# Patient Record
Sex: FEMALE | Race: BLACK OR AFRICAN AMERICAN | NOT HISPANIC OR LATINO | Employment: STUDENT | URBAN - METROPOLITAN AREA
[De-identification: names, ages, dates, MRNs, and addresses within clinical notes are randomized per-mention and may not be internally consistent; named-entity substitution may affect disease eponyms.]

---

## 2023-07-25 LAB
CALCIDIOL (25 OH VITAMIN D3) (NG/ML) IN SER/PLAS: 24 NG/ML
THYROTROPIN (MIU/L) IN SER/PLAS BY DETECTION LIMIT <= 0.05 MIU/L: 26.93 MIU/L (ref 0.44–3.98)
THYROXINE (T4) FREE (NG/DL) IN SER/PLAS: 0.72 NG/DL (ref 0.61–1.12)

## 2023-11-02 PROBLEM — Z90.89 STATUS POST THYROIDECTOMY: Status: ACTIVE | Noted: 2023-11-02

## 2023-11-02 PROBLEM — J45.909 ASTHMA (HHS-HCC): Status: ACTIVE | Noted: 2023-11-02

## 2023-11-02 PROBLEM — R79.89 LOW VITAMIN D LEVEL: Status: ACTIVE | Noted: 2023-11-02

## 2023-11-02 PROBLEM — E89.0 POST-SURGICAL HYPOTHYROIDISM: Status: ACTIVE | Noted: 2023-11-02

## 2023-11-02 PROBLEM — H02.409 PTOSIS: Status: ACTIVE | Noted: 2023-11-02

## 2023-11-02 PROBLEM — H50.21 HYPOTROPIA OF RIGHT EYE: Status: ACTIVE | Noted: 2023-11-02

## 2023-11-02 PROBLEM — H53.2 DIPLOPIA: Status: ACTIVE | Noted: 2023-11-02

## 2023-11-02 PROBLEM — J30.9 ALLERGIC RHINITIS: Status: ACTIVE | Noted: 2023-11-02

## 2023-11-02 PROBLEM — L74.510 HYPERHIDROSIS OF AXILLA: Status: ACTIVE | Noted: 2023-11-02

## 2023-11-02 PROBLEM — H50.22 VERTICAL STRABISMUS OF LEFT EYE: Status: ACTIVE | Noted: 2023-11-02

## 2023-11-02 PROBLEM — Z98.890 STATUS POST THYROIDECTOMY: Status: ACTIVE | Noted: 2023-11-02

## 2023-11-02 PROBLEM — E89.0 STATUS POST THYROIDECTOMY: Status: ACTIVE | Noted: 2023-11-02

## 2023-11-02 PROBLEM — E55.9 VITAMIN D DEFICIENCY: Status: ACTIVE | Noted: 2023-11-02

## 2023-11-02 PROBLEM — G70.00 OCULAR MYASTHENIA (MULTI): Status: ACTIVE | Noted: 2023-11-02

## 2023-11-02 PROBLEM — L74.519 EXCESSIVE SWEATING, LOCAL: Status: ACTIVE | Noted: 2023-11-02

## 2023-11-02 PROBLEM — E05.00 GRAVES DISEASE: Status: ACTIVE | Noted: 2023-11-02

## 2023-11-02 PROBLEM — E05.90 HYPERTHYROIDISM: Status: ACTIVE | Noted: 2023-11-02

## 2023-11-02 RX ORDER — ATENOLOL 25 MG/1
1 TABLET ORAL DAILY
COMMUNITY
End: 2023-11-03 | Stop reason: ALTCHOICE

## 2023-11-02 RX ORDER — LEVOTHYROXINE SODIUM 75 UG/1
1 TABLET ORAL DAILY
COMMUNITY
Start: 2022-06-15 | End: 2023-11-03

## 2023-11-02 RX ORDER — PYRIDOSTIGMINE BROMIDE 60 MG/1
1.5 TABLET ORAL 3 TIMES DAILY
COMMUNITY

## 2023-11-02 RX ORDER — PREDNISOLONE 5 MG/1
5 TABLET ORAL
COMMUNITY

## 2023-11-03 ENCOUNTER — LAB (OUTPATIENT)
Dept: LAB | Facility: LAB | Age: 18
End: 2023-11-03
Payer: COMMERCIAL

## 2023-11-03 ENCOUNTER — OFFICE VISIT (OUTPATIENT)
Dept: PEDIATRIC ENDOCRINOLOGY | Facility: CLINIC | Age: 18
End: 2023-11-03
Payer: COMMERCIAL

## 2023-11-03 VITALS
DIASTOLIC BLOOD PRESSURE: 71 MMHG | HEIGHT: 65 IN | HEART RATE: 64 BPM | TEMPERATURE: 95 F | WEIGHT: 145.4 LBS | SYSTOLIC BLOOD PRESSURE: 110 MMHG | BODY MASS INDEX: 24.22 KG/M2

## 2023-11-03 DIAGNOSIS — E89.0 POST-SURGICAL HYPOTHYROIDISM: Primary | ICD-10-CM

## 2023-11-03 DIAGNOSIS — E89.0 POST-SURGICAL HYPOTHYROIDISM: ICD-10-CM

## 2023-11-03 DIAGNOSIS — E55.9 VITAMIN D DEFICIENCY: ICD-10-CM

## 2023-11-03 LAB
25(OH)D3 SERPL-MCNC: 21 NG/ML (ref 30–100)
T4 FREE SERPL-MCNC: 1.11 NG/DL (ref 0.78–1.48)
TSH SERPL-ACNC: 7.91 MIU/L (ref 0.44–3.98)

## 2023-11-03 PROCEDURE — 36415 COLL VENOUS BLD VENIPUNCTURE: CPT

## 2023-11-03 PROCEDURE — 84439 ASSAY OF FREE THYROXINE: CPT

## 2023-11-03 PROCEDURE — 99417 PROLNG OP E/M EACH 15 MIN: CPT | Performed by: PEDIATRICS

## 2023-11-03 PROCEDURE — 1036F TOBACCO NON-USER: CPT | Performed by: PEDIATRICS

## 2023-11-03 PROCEDURE — 99215 OFFICE O/P EST HI 40 MIN: CPT | Performed by: PEDIATRICS

## 2023-11-03 PROCEDURE — 82306 VITAMIN D 25 HYDROXY: CPT

## 2023-11-03 PROCEDURE — 84443 ASSAY THYROID STIM HORMONE: CPT

## 2023-11-03 RX ORDER — ERGOCALCIFEROL 1.25 MG/1
50000 CAPSULE ORAL
Qty: 6 CAPSULE | Refills: 0 | Status: SHIPPED | OUTPATIENT
Start: 2023-11-03 | End: 2023-12-09

## 2023-11-03 RX ORDER — LEVOTHYROXINE SODIUM 88 UG/1
88 TABLET ORAL
Qty: 30 TABLET | Refills: 11 | Status: SHIPPED | OUTPATIENT
Start: 2023-11-03 | End: 2024-11-02

## 2023-11-03 NOTE — PATIENT INSTRUCTIONS
Recommend scheduling followup visits with Dr Delcid and Dr Queen     After the visit we discussed the results of today's labs and the need to increase your levothyroxine up to equivalent of 88 microgms daily AND another blood test in 4-6 wks to verify that is sufficient to maintain normal thyroid hormone levels and minimize affect on your eyes    I will call you with the results of thyroid hormone level that you obtain in December 2023    Suggest you schedule for followup with me in  6 months so that you can fit into your schedule

## 2023-11-03 NOTE — PROGRESS NOTES
Subjective   Vlad Recinos is a 18-4/9 yo female who presents for followup of postthyroidectomy hypothyroidism, last seen 7/21/22 about 4 wks after thyroidectomy for Graves Disease (originally dxed 4/2017 and managed with methimazole)    In addition she has ocular Myasthenia Gravis managed with pyridostigmine. Last visit with Dr Delcid, ophthalmology 9/2022    Several TFTs since thyroidectomy - initially on LT4 75 microgm daily then 11/15/22 TSH 0.07 (outside lab) - thus reduced LT4  to 50 microgm daily     1/23/23 TSH 3.9 on 50 microgm daily  7/25/23 TSH 26.9, 25OHD 24 though may have missed some doses prior to labs   - resumed LT4 75 microgm daily according to prescription and results note    Hx of vitamin D deficiency (25OHD 18) noted prior to thyroidectomy (5/18/22)    HPI    Communication in summer - 8/9/23, confirming that she had gotten message to increase dose AND should have ergocalciferol once monthly to maintain vitamin D levels    LEVOTHYROXINE - Only missing 1-2 doses per month of 75 microgm - takes at 6-7AM, doesn't eat until 1 PM    - denies heat or (possibly mild cold intol with weather change) intolerance or diaphoresis   - regular menses     No headaches nor tremors    - Stools - daily     In past 2 wks having trouble falling asleep .  Also having eye drooping, dry and may be bulging. No spontaneous tearing nor conjunctival injection.    Followed by Dr Delcid for Graves ophthalmopathy and ocular aspect of myasthenia gravis (though last visit 9/29/22 with instructions to return in 2-3 motnhs)    - on Prednisone 4 tabs of 5 mg each daily - increased 2 wks ago based on how she feels and observation of visiotn according to his past instructions    For past 2-3 yrs has taken prednisone in same dosing as above for 4 wk duration then waits at least 4 wks before would have another course.  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    Physical Exam    Asymmetry with right eye proptosis relative to left. With right lateral downward gaze has diplopia and subtle malalignment.  No conjunctival injection     - Neck scar, no thyroid tissue appreciated    Abd soft without hepatomegaly or striae    No tremor though slight palpmar diaphoresis  Normal DTR and relaxation phase    9/5/23 [facility] (scanned in [facility]) TSH 3.33 (0.4-4.5)    Assessment/Plan   Post surgical hypothyroidism   - clinically euthyroid with TSH in upper normal range      May benefit from LT4 88 microgm daily    2. Hx of graves ophthalmopathy and ocular myasthenia  May have had eye changes but could be result of High TSH over summer (2023) which is now resolved though 9/5 TSH in upper normal range.  Will check TSH today And aim for Tsh < 2 I.e. lower half of normal TSH    Discussed relationship between eye and thyroid as well as Prednisone side effects - with emphasis on potential negative impact on bone if used chronically and adds to the importance of maintaining normal vitamin D levels (adult 25OHD > 30) and calcium intake    Needs f/up with DR Delcid and Bret    Reaching age for transition to adult [facility] care - to be discussed at next visit    ADDENDUM 7PM -- TSH 7.91 - phone call to Vlad - improved TSH but needs further increase in dose to88 microgm daily. Submitting new Rx while in meantime before picks up new dose, take 1 and 1/2 tabs of 75 microgm tomorrow morning (and can take 1.5 " "tabs two days per week) while on the other 5 days of week take ONE tab (2nd 1.5 tab dose on Tuesday) THEN when get 88 mciriomg wuill be once daily    - emphasized to her that it is possible that eye droop was consequence of hypothyroidism with low free T4 in summer    - answered her questions about why \"low thyroid hormone level\" while elevated TSH    Next TFTs in 4-6 wks to verify adequacy of dose vs further titration, will aim for lower half of normal for TSH to minimize impact on eyes    25 OH Vitamin D 21 ng/ml - in Fall thus likely to fall further -- begin once weekly ergocalciferol 50,000 international units weekly x 6 wks -- reassess with future lab set after completion of this course  "

## 2023-11-20 PROBLEM — E05.90 HYPERTHYROIDISM: Status: RESOLVED | Noted: 2023-11-02 | Resolved: 2023-11-20

## 2023-12-12 ENCOUNTER — LAB (OUTPATIENT)
Dept: LAB | Facility: LAB | Age: 18
End: 2023-12-12
Payer: COMMERCIAL

## 2023-12-12 DIAGNOSIS — E89.0 POST-SURGICAL HYPOTHYROIDISM: ICD-10-CM

## 2023-12-12 LAB
T4 FREE SERPL-MCNC: 0.99 NG/DL (ref 0.61–1.12)
TSH SERPL-ACNC: 0.16 MIU/L (ref 0.44–3.98)

## 2023-12-12 PROCEDURE — 84443 ASSAY THYROID STIM HORMONE: CPT

## 2023-12-12 PROCEDURE — 84439 ASSAY OF FREE THYROXINE: CPT

## 2023-12-14 ENCOUNTER — TELEPHONE (OUTPATIENT)
Dept: PEDIATRIC ENDOCRINOLOGY | Facility: CLINIC | Age: 18
End: 2023-12-14
Payer: COMMERCIAL

## 2023-12-14 DIAGNOSIS — E89.0 POST-SURGICAL HYPOTHYROIDISM: Primary | ICD-10-CM

## 2023-12-14 NOTE — TELEPHONE ENCOUNTER
No answer when called -- message recorded on answering machine which:  indicated that will be sending message through chart and based on thyroid test results reduce dose of Levothyroxine by 1/2 tablet for one day of each week (88 microgm tablet)    - repeat thyroid tests in 4-6 wks.      - followup visit with me in May 2024 OR schedule with adult endo, one option is [facility] OR any  adult endocrinologist that is closer to your work or home

## 2024-01-06 DIAGNOSIS — G70.00 MYASTHENIA GRAVIS WITHOUT (ACUTE) EXACERBATION (MULTI): ICD-10-CM

## 2024-01-08 RX ORDER — PREDNISONE 5 MG/1
TABLET ORAL
Qty: 180 TABLET | Refills: 4 | Status: SHIPPED | OUTPATIENT
Start: 2024-01-08

## 2024-01-19 ENCOUNTER — APPOINTMENT (OUTPATIENT)
Dept: ENDOCRINOLOGY | Facility: HOSPITAL | Age: 19
End: 2024-01-19
Payer: COMMERCIAL

## 2024-01-23 ENCOUNTER — APPOINTMENT (OUTPATIENT)
Dept: ENDOCRINOLOGY | Facility: HOSPITAL | Age: 19
End: 2024-01-23
Payer: COMMERCIAL

## 2024-01-26 ENCOUNTER — DOCUMENTATION (OUTPATIENT)
Dept: PEDIATRIC ENDOCRINOLOGY | Facility: HOSPITAL | Age: 19
End: 2024-01-26
Payer: COMMERCIAL

## 2024-01-26 ENCOUNTER — TELEPHONE (OUTPATIENT)
Dept: PEDIATRIC ENDOCRINOLOGY | Facility: CLINIC | Age: 19
End: 2024-01-26

## 2024-01-26 NOTE — PROGRESS NOTES
Lab results from 1/25/24: TSH 0.03 and FreeT4 1.7 despite reduction in dose of LT4 to 88 microgm daily for 6 day/wk and 44 microgm once weekly    Will reduce dose to 75 microgms daily --- once patient returns call and confirms the current dose that she is taking

## 2024-01-28 ENCOUNTER — PATIENT MESSAGE (OUTPATIENT)
Dept: PEDIATRIC ENDOCRINOLOGY | Facility: CLINIC | Age: 19
End: 2024-01-28
Payer: COMMERCIAL

## 2024-01-28 DIAGNOSIS — E05.90 HYPERTHYROIDISM: Primary | ICD-10-CM

## 2024-01-29 ENCOUNTER — DOCUMENTATION (OUTPATIENT)
Dept: PEDIATRIC ENDOCRINOLOGY | Facility: HOSPITAL | Age: 19
End: 2024-01-29
Payer: COMMERCIAL

## 2024-01-29 DIAGNOSIS — E89.0 POST-SURGICAL HYPOTHYROIDISM: ICD-10-CM

## 2024-01-29 RX ORDER — LEVOTHYROXINE SODIUM 125 UG/1
62.5 TABLET ORAL DAILY
Qty: 15 TABLET | Refills: 11 | Status: SHIPPED | OUTPATIENT
Start: 2024-01-29 | End: 2025-01-28

## 2024-01-30 NOTE — PROGRESS NOTES
Via patient message, Vlad reports that she has been taking 88 microgm daily for 6 days per week and no levothyroxine once weekly (rather than the 1/14/23 recommendation of 1/2 of 88 microgm once weekly and whole tablet the other 6 days per week)     As previously noted 1/25/24: TFT TSH 0.03 (0.4-4.5), Free T4 1.7 (0.8-1.4)     Based on reported dose actually taking which would equal average of 75 microgm, must reduce dose to successfully reduce TFT adequately --- prescribing 62. 5 microgm daily (1/2 of 125 microgm).  Followup labs in 4 wks as previously noted

## 2024-08-03 ENCOUNTER — LAB (OUTPATIENT)
Dept: LAB | Facility: LAB | Age: 19
End: 2024-08-03
Payer: COMMERCIAL

## 2024-08-03 DIAGNOSIS — E05.90 HYPERTHYROIDISM: ICD-10-CM

## 2024-08-03 LAB
T4 FREE SERPL-MCNC: 0.62 NG/DL (ref 0.61–1.12)
TSH SERPL-ACNC: 19.19 MIU/L (ref 0.44–3.98)

## 2024-08-03 PROCEDURE — 84443 ASSAY THYROID STIM HORMONE: CPT

## 2024-08-03 PROCEDURE — 36415 COLL VENOUS BLD VENIPUNCTURE: CPT

## 2024-08-03 PROCEDURE — 84439 ASSAY OF FREE THYROXINE: CPT

## 2024-08-05 ENCOUNTER — TELEPHONE (OUTPATIENT)
Dept: PEDIATRIC ENDOCRINOLOGY | Facility: HOSPITAL | Age: 19
End: 2024-08-05
Payer: COMMERCIAL

## 2024-08-05 DIAGNOSIS — E89.0 POST-SURGICAL HYPOTHYROIDISM: Primary | ICD-10-CM

## 2024-08-05 RX ORDER — LEVOTHYROXINE SODIUM 75 UG/1
75 TABLET ORAL DAILY
Qty: 30 TABLET | Refills: 11 | Status: SHIPPED | OUTPATIENT
Start: 2024-08-05 | End: 2025-08-05

## 2024-08-05 NOTE — TELEPHONE ENCOUNTER
Phone call - Vlad denies any symptoms   - taking 1/2 of 125 microgm tablet daily however missing about 1 dose per week (thus av about 53 microgm/d) - following suppressed TSH and discussion on 1/29/24.  Had been due for f/up TFTs 4 wks later but obtained 8/3/24    Will increase to LT4 75 microgm daily and try to take daily   - needs f/up TFT in 4 wks AND   - to book f/up visit    SOCIAL currently working 7A-4PM Mon-Fri, not attending school at this time

## 2024-08-19 ENCOUNTER — APPOINTMENT (OUTPATIENT)
Dept: ENDOCRINOLOGY | Facility: CLINIC | Age: 19
End: 2024-08-19
Payer: COMMERCIAL

## 2024-09-20 ENCOUNTER — LAB (OUTPATIENT)
Dept: LAB | Facility: LAB | Age: 19
End: 2024-09-20
Payer: COMMERCIAL

## 2024-09-20 DIAGNOSIS — E89.0 POST-SURGICAL HYPOTHYROIDISM: ICD-10-CM

## 2024-09-20 LAB
T4 FREE SERPL-MCNC: 0.86 NG/DL (ref 0.61–1.12)
TSH SERPL-ACNC: 17.56 MIU/L (ref 0.44–3.98)

## 2024-09-20 PROCEDURE — 84443 ASSAY THYROID STIM HORMONE: CPT

## 2024-09-20 PROCEDURE — 36415 COLL VENOUS BLD VENIPUNCTURE: CPT

## 2024-09-20 PROCEDURE — 84439 ASSAY OF FREE THYROXINE: CPT

## 2024-09-26 ENCOUNTER — APPOINTMENT (OUTPATIENT)
Dept: PEDIATRIC ENDOCRINOLOGY | Facility: CLINIC | Age: 19
End: 2024-09-26
Payer: COMMERCIAL

## 2024-09-26 VITALS
BODY MASS INDEX: 26.46 KG/M2 | HEIGHT: 64 IN | DIASTOLIC BLOOD PRESSURE: 71 MMHG | WEIGHT: 154.98 LBS | HEART RATE: 71 BPM | SYSTOLIC BLOOD PRESSURE: 110 MMHG | RESPIRATION RATE: 16 BRPM

## 2024-09-26 DIAGNOSIS — E89.0 POST-SURGICAL HYPOTHYROIDISM: Primary | ICD-10-CM

## 2024-09-26 DIAGNOSIS — E05.00 GRAVES' OPHTHALMOPATHY: ICD-10-CM

## 2024-09-26 PROCEDURE — 3008F BODY MASS INDEX DOCD: CPT | Performed by: PEDIATRICS

## 2024-09-26 PROCEDURE — 99214 OFFICE O/P EST MOD 30 MIN: CPT | Performed by: PEDIATRICS

## 2024-09-26 RX ORDER — LEVOTHYROXINE SODIUM 88 UG/1
88 TABLET ORAL DAILY
Qty: 30 TABLET | Refills: 11 | Status: SHIPPED | OUTPATIENT
Start: 2024-09-26 | End: 2025-09-26

## 2024-09-26 NOTE — PATIENT INSTRUCTIONS
We will increase your levothyroxine to 88 microgms daily   - if you forget on one day you make 2 tablets the next day   - check blood tests again in 4-6 wks and we will determine if further increase in dose is needed    Change timing of sea phelan so that at least 4 hrs separation from taking levothyroxine    Take daily womens multivitamin with dinner especially between Oct and April of each year    See Dr Delcid for followup of Graves eye disease and past history of ocular myasthenia    Next followup in 6 months

## 2024-09-26 NOTE — PROGRESS NOTES
"Subjective   Vlad Recinos is a 19 y.o. female presenting for Follow-up.  Last seen 11/3/23 at which time, recommended increasing LT4 to 88 microgm daily     [facility] 1/25/24: Free T4 1.7 (0.8-1.4), TSH 0.03 while taking 88 microgm 6 days/wk and 44 microgms  1/26/23 in phone discussion based on results, reduced LT4 to 62.5 microgms daily, however        Vlad is being seen today for post-surgical hypothyroidism, following medication therapy for Graves Disease which was diagnosed 4/2017 and managed with methimazole.     Thyroidectomy 6/2022    Additionally has had Graves ophthalmopathy with dry eyes and hx of ocular myasthenia, followed by Dr Delcid    Hx of Present Illness:     Medications : Levothyroxine 75 microgm daily - morning; with water then eating about 60 minutes later    Adherence : misses >2 doses per week - estimate 3 days per month missed dose - no pattern to when forgets.     Hypothyroid Symptoms : fatigue - a little tired but functions with 8.5 hrs sleep daily. Menses 6 days duration,  monthly  Slightly cold intolerance but no hair shedding nor dry skin  Stools - currently daily    Vlad Suspects lactose intolerant so taking Lactaid   Denies dyspnea nor dysphagia    Eating sea phelan (jello) -blue spirulina (made by her grandmother - taking before breakfast. Taking since June 2024 (based on Grandmother's recommendation)    Review of Systems  OPHTHALMOLOGY: No longer takes prednisone and stopped pyridostigmine (care with Dr Delcid)   - 9/23/22 last visit with recs to have f/up in 2-3 mos due to an exacerbation of ocular myasthenia    Objective   /71 (BP Location: Right arm, Patient Position: Sitting)   Pulse 71   Resp 16   Ht 1.636 m (5' 4.41\")   Wt 70.3 kg (154 lb 15.7 oz)   BMI 26.27 kg/m²      Physical Exam   Alert, well- hydrated  PERRL   Thyroid of normal size and consistency  Integument warm and supple  DTR 2+/4+ in all 4 extremities with normal relaxation phase  No tremor with arm " extension and finger abduction        Thyroid Stimulating Hormone   Date Value Ref Range Status   09/20/2024 17.56 (H) 0.44 - 3.98 mIU/L Final   08/03/2024 19.19 (H) 0.44 - 3.98 mIU/L Final   12/12/2023 0.16 (L) 0.44 - 3.98 mIU/L Final     Thyroxine, Free   Date Value Ref Range Status   09/20/2024 0.86 0.61 - 1.12 ng/dL Final   08/03/2024 0.62 0.61 - 1.12 ng/dL Final   12/12/2023 0.99 0.61 - 1.12 ng/dL Final         Assessment/Plan    Post surgical hypothyroidism with recent increase in TSH which in retrospect coincides with addition of Sea phelan jelly added to daily intake, often consumed at same time as levothyroxine (or at least less than 60 minutes after LT4 taken)          - by hx having dose omission almost once weekly as well          - some increase in BMI and weight since 11/2023 visit          - discussed issues of sea phelan (and potentially increased Iodine load as well as fiber) as well as safe means by which to ensure maintaining adequate LT4 levels if misses dose on any given day.       Needs ongoing followup until has adequate understanding of hypothyroidism and LT4 dosing/management of hypothyroidism. Next visit therefore in 6 months - at any time she can transition to adult endo, however I will continue to follow labs and have followup visits until she transitions and visits are still available with me    2.  Hx of ocular myasthenia and risk of Graves ophthalmopathy - due for followup assessment with Dr Delcid in ophthalmology

## 2024-10-21 ENCOUNTER — APPOINTMENT (OUTPATIENT)
Dept: ENDOCRINOLOGY | Facility: CLINIC | Age: 19
End: 2024-10-21
Payer: COMMERCIAL

## 2024-11-13 ENCOUNTER — LAB (OUTPATIENT)
Dept: LAB | Facility: LAB | Age: 19
End: 2024-11-13
Payer: COMMERCIAL

## 2024-11-13 DIAGNOSIS — E89.0 POST-SURGICAL HYPOTHYROIDISM: ICD-10-CM

## 2024-11-13 LAB — TSH SERPL-ACNC: 0.58 MIU/L (ref 0.44–3.98)

## 2024-11-13 PROCEDURE — 84443 ASSAY THYROID STIM HORMONE: CPT

## 2024-11-13 PROCEDURE — 36415 COLL VENOUS BLD VENIPUNCTURE: CPT

## 2024-11-14 DIAGNOSIS — E89.0 POST-SURGICAL HYPOTHYROIDISM: Primary | ICD-10-CM

## 2024-12-15 ENCOUNTER — HOSPITAL ENCOUNTER (EMERGENCY)
Facility: HOSPITAL | Age: 19
Discharge: HOME | End: 2024-12-15
Payer: COMMERCIAL

## 2024-12-15 VITALS
DIASTOLIC BLOOD PRESSURE: 73 MMHG | TEMPERATURE: 98.1 F | SYSTOLIC BLOOD PRESSURE: 113 MMHG | WEIGHT: 150 LBS | OXYGEN SATURATION: 100 % | RESPIRATION RATE: 18 BRPM | BODY MASS INDEX: 25.61 KG/M2 | HEART RATE: 90 BPM | HEIGHT: 64 IN

## 2024-12-15 DIAGNOSIS — B96.89 BACTERIAL VAGINOSIS: Primary | ICD-10-CM

## 2024-12-15 DIAGNOSIS — N76.0 BACTERIAL VAGINOSIS: Primary | ICD-10-CM

## 2024-12-15 LAB
APPEARANCE UR: CLEAR
BACTERIA #/AREA URNS AUTO: ABNORMAL /HPF
BILIRUB UR STRIP.AUTO-MCNC: NEGATIVE MG/DL
CLUE CELLS SPEC QL WET PREP: PRESENT
COLOR UR: ABNORMAL
GLUCOSE UR STRIP.AUTO-MCNC: NORMAL MG/DL
HCG UR QL IA.RAPID: NEGATIVE
HOLD SPECIMEN: NORMAL
KETONES UR STRIP.AUTO-MCNC: NEGATIVE MG/DL
LEUKOCYTE ESTERASE UR QL STRIP.AUTO: NEGATIVE
MUCOUS THREADS #/AREA URNS AUTO: ABNORMAL /LPF
NITRITE UR QL STRIP.AUTO: NEGATIVE
PH UR STRIP.AUTO: 6 [PH]
PROT UR STRIP.AUTO-MCNC: NEGATIVE MG/DL
RBC # UR STRIP.AUTO: ABNORMAL /UL
RBC #/AREA URNS AUTO: ABNORMAL /HPF
SP GR UR STRIP.AUTO: 1.03
SQUAMOUS #/AREA URNS AUTO: ABNORMAL /HPF
T VAGINALIS SPEC QL WET PREP: ABNORMAL
UROBILINOGEN UR STRIP.AUTO-MCNC: NORMAL MG/DL
WBC #/AREA URNS AUTO: ABNORMAL /HPF
WBC VAG QL WET PREP: ABNORMAL
YEAST VAG QL WET PREP: ABNORMAL

## 2024-12-15 PROCEDURE — 99284 EMERGENCY DEPT VISIT MOD MDM: CPT

## 2024-12-15 PROCEDURE — 87210 SMEAR WET MOUNT SALINE/INK: CPT | Performed by: NURSE PRACTITIONER

## 2024-12-15 PROCEDURE — 87591 N.GONORRHOEAE DNA AMP PROB: CPT | Mod: AHULAB | Performed by: NURSE PRACTITIONER

## 2024-12-15 PROCEDURE — 81025 URINE PREGNANCY TEST: CPT | Performed by: NURSE PRACTITIONER

## 2024-12-15 PROCEDURE — 2500000004 HC RX 250 GENERAL PHARMACY W/ HCPCS (ALT 636 FOR OP/ED): Performed by: NURSE PRACTITIONER

## 2024-12-15 PROCEDURE — 96372 THER/PROPH/DIAG INJ SC/IM: CPT | Performed by: NURSE PRACTITIONER

## 2024-12-15 PROCEDURE — 81001 URINALYSIS AUTO W/SCOPE: CPT | Performed by: NURSE PRACTITIONER

## 2024-12-15 PROCEDURE — 2500000001 HC RX 250 WO HCPCS SELF ADMINISTERED DRUGS (ALT 637 FOR MEDICARE OP): Performed by: NURSE PRACTITIONER

## 2024-12-15 RX ORDER — KETOROLAC TROMETHAMINE 30 MG/ML
30 INJECTION, SOLUTION INTRAMUSCULAR; INTRAVENOUS ONCE
Status: COMPLETED | OUTPATIENT
Start: 2024-12-15 | End: 2024-12-15

## 2024-12-15 RX ORDER — METRONIDAZOLE 500 MG/1
500 TABLET ORAL 2 TIMES DAILY
Qty: 14 TABLET | Refills: 0 | Status: SHIPPED | OUTPATIENT
Start: 2024-12-15 | End: 2024-12-22

## 2024-12-15 RX ORDER — NAPROXEN 500 MG/1
500 TABLET ORAL
Qty: 20 TABLET | Refills: 0 | Status: SHIPPED | OUTPATIENT
Start: 2024-12-15 | End: 2024-12-25

## 2024-12-15 RX ORDER — METRONIDAZOLE 500 MG/1
500 TABLET ORAL ONCE
Status: COMPLETED | OUTPATIENT
Start: 2024-12-15 | End: 2024-12-15

## 2024-12-15 ASSESSMENT — PAIN DESCRIPTION - PAIN TYPE: TYPE: ACUTE PAIN

## 2024-12-15 ASSESSMENT — COLUMBIA-SUICIDE SEVERITY RATING SCALE - C-SSRS
2. HAVE YOU ACTUALLY HAD ANY THOUGHTS OF KILLING YOURSELF?: NO
1. IN THE PAST MONTH, HAVE YOU WISHED YOU WERE DEAD OR WISHED YOU COULD GO TO SLEEP AND NOT WAKE UP?: NO
6. HAVE YOU EVER DONE ANYTHING, STARTED TO DO ANYTHING, OR PREPARED TO DO ANYTHING TO END YOUR LIFE?: NO

## 2024-12-15 ASSESSMENT — PAIN - FUNCTIONAL ASSESSMENT: PAIN_FUNCTIONAL_ASSESSMENT: 0-10

## 2024-12-15 ASSESSMENT — PAIN SCALES - GENERAL: PAINLEVEL_OUTOF10: 10 - WORST POSSIBLE PAIN

## 2024-12-15 ASSESSMENT — PAIN DESCRIPTION - LOCATION: LOCATION: PELVIS

## 2024-12-15 NOTE — Clinical Note
Vlad Recinos was seen and treated in our emergency department on 12/15/2024.  She may return to work on 12/17/2024.       If you have any questions or concerns, please don't hesitate to call.      Petros Luque, APRN-CNP

## 2024-12-15 NOTE — ED PROVIDER NOTES
HPI   Chief Complaint   Patient presents with    cramps       19-year-old female presents today with cramps that are pelvic in nature.  She denies prior history of cramps during menstruation she is presently menstruating.  She has a history of ovarian cysts.  She is rating the pain 10 out of 10.  She took Advil 30 minutes before arrival in the ED.  She denies any trauma.  She denies dysuria.  She has had the same sexual partner for 2 months.  She has had no other prior sexual partners in the last 12 months no history of STD.  She denies pregnancy.  She describes the pain as sharp and cramping.  The last time she ate was at 1 AM she had chicken.  She denies nausea or vomiting.  She denies constipation or diarrhea.  She has a history of postsurgical hypothyroidism.  She denies any allergies to medication.      History provided by:  Parent and patient   used: No            Patient History   Past Medical History:   Diagnosis Date    Allergy status to unspecified drugs, medicaments and biological substances 12/14/2015    History of seasonal allergies    Dislocation of unspecified interphalangeal joint of right little finger, initial encounter 05/09/2017    Dislocation of interphalangeal joint of right little finger, initial encounter    Displaced fracture of middle phalanx of right little finger, initial encounter for closed fracture 05/09/2017    Closed displaced fracture of middle phalanx of right little finger, initial encounter    Dry eye syndrome of bilateral lacrimal glands 02/11/2019    Dry eyes, bilateral    Other specified disorders of eye and adnexa 05/09/2017    Thyroid eye disease    Other specified disorders of eye and adnexa 05/09/2017    Thyroid eye disease    Other specified strabismus 02/11/2019    Restrictive strabismus    Unspecified asthma, uncomplicated ([facility]) 06/24/2020    Asthma    Unspecified ptosis of right eyelid 09/29/2022    Acquired ptosis of right eyelid     Past  Surgical History:   Procedure Laterality Date    OTHER SURGICAL HISTORY  06/23/2022    Thyroidectomy     Family History   Problem Relation Name Age of Onset    Hypertension Mother      Diabetes Father      Glaucoma Paternal Great-Grandmother       Social History     Tobacco Use    Smoking status: Never    Smokeless tobacco: Never   Substance Use Topics    Alcohol use: Not on file    Drug use: Not on file       Physical Exam   ED Triage Vitals [12/15/24 0924]   Temperature Heart Rate Respirations BP   36.7 °C (98.1 °F) 90 18 113/73      Pulse Ox Temp Source Heart Rate Source Patient Position   100 % Temporal Monitor Sitting      BP Location FiO2 (%)     Left arm --       Physical Exam  Constitutional:       Appearance: Normal appearance.   HENT:      Head: Normocephalic and atraumatic.      Right Ear: Tympanic membrane normal.      Left Ear: Tympanic membrane normal.      Nose: Nose normal.      Mouth/Throat:      Mouth: Mucous membranes are dry.   Eyes:      Extraocular Movements: Extraocular movements intact.      Pupils: Pupils are equal, round, and reactive to light.   Cardiovascular:      Rate and Rhythm: Normal rate and regular rhythm.      Pulses: Normal pulses.      Heart sounds: Normal heart sounds.   Pulmonary:      Effort: Pulmonary effort is normal.      Breath sounds: Normal breath sounds.   Abdominal:      General: Abdomen is flat.      Palpations: Abdomen is soft.      Tenderness: There is abdominal tenderness.   Genitourinary:     Vagina: Vaginal discharge present.      Comments: Malodorous odor.  Positive right adnexal tenderness.  Patient was menstruating  Musculoskeletal:         General: Normal range of motion.      Cervical back: Normal range of motion and neck supple.   Skin:     General: Skin is warm.      Capillary Refill: Capillary refill takes less than 2 seconds.   Neurological:      General: No focal deficit present.      Mental Status: She is alert and oriented to person, place, and time.    Psychiatric:         Mood and Affect: Mood normal.         Behavior: Behavior normal.           ED Course & MDM   Diagnoses as of 12/15/24 1109   Bacterial vaginosis                 No data recorded                                 Medical Decision Making  Pelvic exam revealed right adnexal tenderness.  This was concerning for STD.  She had a malodorous odor.  Urinalysis was ordered pregnancy was ordered to rule out ectopic.  Patient responded well to Toradol.  Vital signs were stable.  Patient was positive for bacterial vaginosis.  She received her first dose of Flagyl and I sent 7-day prescription to her pharmacy.  She responded well to Toradol and was pain-free.  She was placed on Naprosyn twice daily.  I requested appointment for gynecology and patient understands she should call her central scheduling intake first available appointment.  Safely discharged home with return precautions.    Amount and/or Complexity of Data Reviewed  Labs: ordered.        Procedure  Procedures     Petros Luque, VENKATA-CNP  12/15/24 1102

## 2024-12-16 LAB
C TRACH RRNA SPEC QL NAA+PROBE: NEGATIVE
N GONORRHOEA DNA SPEC QL PROBE+SIG AMP: NEGATIVE

## 2025-01-23 ENCOUNTER — LAB (OUTPATIENT)
Dept: LAB | Facility: LAB | Age: 20
End: 2025-01-23
Payer: COMMERCIAL

## 2025-01-23 DIAGNOSIS — E89.0 POST-SURGICAL HYPOTHYROIDISM: ICD-10-CM

## 2025-01-23 LAB
T4 FREE SERPL-MCNC: 1.5 NG/DL (ref 0.78–1.48)
TSH SERPL-ACNC: 5.61 MIU/L (ref 0.44–3.98)

## 2025-01-23 PROCEDURE — 84439 ASSAY OF FREE THYROXINE: CPT

## 2025-01-23 PROCEDURE — 84443 ASSAY THYROID STIM HORMONE: CPT

## 2025-01-29 NOTE — PROGRESS NOTES
"Subjective   Vlad Recinos is a 19 y.o. female who presents to endocrinology clinic for transition of care (first visit in adult endocrinology)  Last visit: 9/26/24 - Dr. Pimentel    ENDOCRINE Hx:   Graves Disease was diagnosed 4/2017 and managed with methimazole then underwent thyroidectomy 6/2022.  Has been on levothyroxine since.  Additionally has had Graves ophthalmopathy with dry eyes and hx of ocular myasthenia, followed by Dr Delcid.    INTERVAL Hx:   Currently taking 88mcg of levothyroxine every morning, with water.  Waits at least 3 hours before eating typically.  Reports missed a couple doses this month when she was sick with a URI 2 weeks ago.      States missed appt with Dr. Delcid yesterday, says eyes/vision lately have been \"fine\" since last flare was back in Oct (droopy eyelids, sensitive to light, double vision).      Occasional headaches. Reports good energy and appetite.  No heat/cold intolerance, diarrhea, constipation, skin/hair changes, palpitations, tremors, myalgias, excessive weight changes. Menses monthly, LMP 2 weeks ago     ROS: otherwise negative.    Objective   /67 (BP Location: Right arm, Patient Position: Sitting, BP Cuff Size: Adult)   Pulse 73   Temp 35.7 °C (96.3 °F) (Temporal)   Ht 1.626 m (5' 4\")   Wt 70.8 kg (156 lb)   BMI 26.78 kg/m²     Physical Exam  Alert and conversant, in no acute distress  Sclera anicteric, no lid lag, subtle proptosis  mmm  Thyroidectomy scar well healed, no palpable thyroid tissue  normal work of breathing  No resting tremor  Skin warm, normal moisture; no acanthosis, hirsutism, or acne     Lab Results   Component Value Date    TSH 5.61 (H) 01/23/2025    FREET4 1.50 (H) 01/23/2025    ALT 7 03/04/2023    AST 14 03/04/2023         Assessment/Plan   19 y.o. F with Graves disease c/b ophthalmopathy as well as ocular myasthenia s/p TTx in 2022 now with Post-surgical hypothyroidism    Last TSH a bit above normal limits, although she is overall " clinically euthyroid.  Taking it appropriately and overall reports good adherence.  Missed recent ophtho follow-up - is now scheduled to see Dr. Delcid in May    PLAN  -  INCREASE levothyroxine (Synthroid, Levoxyl) to 100 mcg by mouth early in the morning.. Take on an empty stomach at the same time each day, at least 30 minutes prior to eating (advised can also use up her current supply of 88mcg tabs by taking an extra tab once a week).    -     TSH with reflex to Free T4 if abnormal; 6 weeks  -     Follow Up In Endocrinology; annually or sooner PRN

## 2025-01-30 ENCOUNTER — APPOINTMENT (OUTPATIENT)
Dept: OPHTHALMOLOGY | Facility: CLINIC | Age: 20
End: 2025-01-30
Payer: COMMERCIAL

## 2025-01-31 ENCOUNTER — APPOINTMENT (OUTPATIENT)
Dept: ENDOCRINOLOGY | Facility: CLINIC | Age: 20
End: 2025-01-31
Payer: COMMERCIAL

## 2025-01-31 VITALS
TEMPERATURE: 96.3 F | HEART RATE: 73 BPM | HEIGHT: 64 IN | SYSTOLIC BLOOD PRESSURE: 105 MMHG | BODY MASS INDEX: 26.63 KG/M2 | WEIGHT: 156 LBS | DIASTOLIC BLOOD PRESSURE: 67 MMHG

## 2025-01-31 DIAGNOSIS — E05.00 GRAVES DISEASE: ICD-10-CM

## 2025-01-31 DIAGNOSIS — E89.0 POST-SURGICAL HYPOTHYROIDISM: Primary | ICD-10-CM

## 2025-01-31 PROCEDURE — 99214 OFFICE O/P EST MOD 30 MIN: CPT | Performed by: INTERNAL MEDICINE

## 2025-01-31 PROCEDURE — 1036F TOBACCO NON-USER: CPT | Performed by: INTERNAL MEDICINE

## 2025-01-31 PROCEDURE — 3008F BODY MASS INDEX DOCD: CPT | Performed by: INTERNAL MEDICINE

## 2025-01-31 RX ORDER — LEVOTHYROXINE SODIUM 100 UG/1
100 TABLET ORAL DAILY
Qty: 90 TABLET | Refills: 3 | Status: SHIPPED | OUTPATIENT
Start: 2025-01-31 | End: 2026-01-31

## 2025-01-31 ASSESSMENT — PATIENT HEALTH QUESTIONNAIRE - PHQ9
2. FEELING DOWN, DEPRESSED OR HOPELESS: NOT AT ALL
1. LITTLE INTEREST OR PLEASURE IN DOING THINGS: NOT AT ALL
SUM OF ALL RESPONSES TO PHQ9 QUESTIONS 1 AND 2: 0

## 2025-01-31 ASSESSMENT — ENCOUNTER SYMPTOMS
LOSS OF SENSATION IN FEET: 0
DEPRESSION: 0
OCCASIONAL FEELINGS OF UNSTEADINESS: 0

## 2025-02-28 DIAGNOSIS — E89.0 POST-SURGICAL HYPOTHYROIDISM: ICD-10-CM

## 2025-03-13 ENCOUNTER — APPOINTMENT (OUTPATIENT)
Dept: PEDIATRIC ENDOCRINOLOGY | Facility: CLINIC | Age: 20
End: 2025-03-13
Payer: COMMERCIAL

## 2025-04-11 LAB — TSH SERPL-ACNC: 1.51 MIU/L

## 2025-05-28 NOTE — PROGRESS NOTES
"Assessment and Plan    02/18/2021 neck flexion & extension 5/5 strength.      05/29/2025 Sidney OD 24 & OS 24 at base 95.  09/14/2021 Sidney OD 23 & OS 23 at base 95.  04/15/2021 Sidney OD 23 & OS 23.5 at base 95.  06/05/2020 Sidney OD 22 & OS 22 at base 95.  10/21/2019 Sidney OD 23 & OS 22 at base 95.  04/15/2019 Sidney OD 22 & OS 21.5 at base 95.  02/14/2019 Sidney OD 26 & OS 23 at base 105.  08/16/2018 Sidney OD 27 & OS 24 at base 105.     04/18/2019 CT chest, by report, shows \"1. Diffusely enlarged thyroid gland. Otherwise, no acute intrathoracic abnormalities identified. 2. No thymic lesion identified.\"  04/27/2017 CT facial bones, which I personally reviewed previously, shows proptosis.    03/26/2019 EMG “Recruited single fiber jitter study of the right frontalis is abnormal and consistent with a neuromuscular junction disorder as seen with myasthenia gravis.”  03/23/2019 EMG “EMG examination of the right upper and lower extremities, including slow repetitive stimulation of the median, spinal accessory and facial nerves at rest and following exercise, is normal. There is no evidence, by repetitive stimulations,   of a neuromuscular junction defect of the postsynaptic type, as seen with myasthenia gravis. In addition, there is evidence of myopathy seen in limited needle EMG. A single fiber EMG study is recommended if clinically indicated.”    Myasthenia gravis studies  Lab Results   Component Value Date/Time    MUSKAB 0.00 03/18/2019 1640     Thyroid eye disease studies  Lab Results   Component Value Date/Time    RECE 3.08 (H) 07/11/2019 1640    TSI 1.5 (H) 07/11/2019 1640     03/18/2019 LRP4 & MuSK antibodies negative. TFTs wnl.  04/27/2017 TSIg 4.9 & 4.3. TPO 43. Acetylcholine receptor binding, blocking & modulating antibodies negative.  TSH consistently low 4/27/2017 - 10/12/2017.    06/05/2020 OCT RNFL  & . (stable)  10/21/2019 OCT RNFL  & . (stable)  02/14/2019 OCT RNFL  & OS " 116.  08/16/2018 OCT RNFL  & .  01/29/2018 OCT RNFL  & .  10/30/2017 OCT RNFL OD 99 & .    06/05/2020 HVF 24-2 OD fovea 31, wnl MD -0.78 & OS fovea 40, wnl MD -0.85.  10/21/2019 HVF 24-2 OD fovea 40, wnl MD -1.25 & OS fovea 35, mild scatter MD -3.80.  02/14/2019 HVF 24-2 OD wnl MD -1.61 & OS wnl MD -2.01.  08/16/2018 HVF 24-2 OD wnl MD -1.01 & OS wnl MD -1.47.  01/29/2018 HVF 24-2 OD wnl MD -0.96 & OS wnl MD -1.23.  05/31/2017 HVF 24-2 OD wnl MD -2.16 & OS wnl MD -1.79.    This 19 year-old woman with a history of Graves disease, ocular myasthenia gravis presents for follow up evaluation of thyroid eye disease and ocular myasthenia gravis.    She has lid findings consistent with thyroid eye disease that appear overall stable. Her episodes likely were ocular myasthenia exacerbation. The lack of upper lid retraction is influenced by ocular myasthenia, but given good lid position and lack of diplopia, I do not recommend resuming ocular myasthenia treatment. Thyroid function control remains important. We discussed the options of surgical management of thyroid eye disease if she is interested given long term stability although reasonable to delay.    Plan    Continue off pyridostigmine and prednisone.  Continue thyroid function monitoring and treatment.   Artificial tears as needed.    Follow up in 6 months with stereo plates, returning sooner with possible pyridostigmine prescription if lids worsen or diplopia happens. (last dilated 10/21/2019).

## 2025-05-29 ENCOUNTER — APPOINTMENT (OUTPATIENT)
Dept: OPHTHALMOLOGY | Facility: CLINIC | Age: 20
End: 2025-05-29
Payer: COMMERCIAL

## 2025-05-29 DIAGNOSIS — E05.00 GRAVES' OPHTHALMOPATHY: ICD-10-CM

## 2025-05-29 DIAGNOSIS — G70.00 OCULAR MYASTHENIA (MULTI): Primary | ICD-10-CM

## 2025-05-29 PROCEDURE — 99213 OFFICE O/P EST LOW 20 MIN: CPT | Performed by: PSYCHIATRY & NEUROLOGY

## 2025-05-29 ASSESSMENT — CONF VISUAL FIELD
OD_INFERIOR_NASAL_RESTRICTION: 0
OS_SUPERIOR_NASAL_RESTRICTION: 0
OS_INFERIOR_TEMPORAL_RESTRICTION: 0
OS_INFERIOR_NASAL_RESTRICTION: 0
OD_SUPERIOR_NASAL_RESTRICTION: 0
OD_NORMAL: 1
OD_SUPERIOR_TEMPORAL_RESTRICTION: 0
METHOD: COUNTING FINGERS
OS_SUPERIOR_TEMPORAL_RESTRICTION: 0
OD_INFERIOR_TEMPORAL_RESTRICTION: 0
OS_NORMAL: 1

## 2025-05-29 ASSESSMENT — ENCOUNTER SYMPTOMS
CARDIOVASCULAR NEGATIVE: 0
GASTROINTESTINAL NEGATIVE: 0
CONSTITUTIONAL NEGATIVE: 0
MUSCULOSKELETAL NEGATIVE: 0
HEMATOLOGIC/LYMPHATIC NEGATIVE: 0
RESPIRATORY NEGATIVE: 0
NEUROLOGICAL NEGATIVE: 0
ALLERGIC/IMMUNOLOGIC NEGATIVE: 0
EYES NEGATIVE: 1
ENDOCRINE NEGATIVE: 0
PSYCHIATRIC NEGATIVE: 0

## 2025-05-29 ASSESSMENT — CUP TO DISC RATIO
OS_RATIO: .3
OD_RATIO: .3

## 2025-05-29 ASSESSMENT — EXTERNAL EXAM - RIGHT EYE: OD_EXAM: NORMAL

## 2025-05-29 ASSESSMENT — VISUAL ACUITY
METHOD: SNELLEN - LINEAR
OD_CC: 20/20-2
CORRECTION_TYPE: GLASSES
OS_CC: 20/20-1

## 2025-05-29 ASSESSMENT — MARGIN REFLEX DISTANCE
OD_MRD1: 4
OS_MRD1: 3

## 2025-05-29 ASSESSMENT — SLIT LAMP EXAM - LIDS
COMMENTS: LID RETRACTION: LOWER LID
COMMENTS: LID RETRACTION: LOWER LID

## 2025-05-29 ASSESSMENT — TONOMETRY
OS_IOP_MMHG: 16
OD_IOP_MMHG: 14
IOP_METHOD: GOLDMANN APPLANATION

## 2025-05-29 ASSESSMENT — LEVATOR FUNCTION
OD_LEVATOR: 18
OS_LEVATOR: 18

## 2025-05-29 ASSESSMENT — EXTERNAL EXAM - LEFT EYE: OS_EXAM: NORMAL

## 2025-06-16 ENCOUNTER — APPOINTMENT (OUTPATIENT)
Dept: OBSTETRICS AND GYNECOLOGY | Facility: CLINIC | Age: 20
End: 2025-06-16
Payer: COMMERCIAL

## 2025-06-16 VITALS
SYSTOLIC BLOOD PRESSURE: 114 MMHG | BODY MASS INDEX: 25.51 KG/M2 | DIASTOLIC BLOOD PRESSURE: 62 MMHG | HEIGHT: 64 IN | WEIGHT: 149.4 LBS

## 2025-06-16 DIAGNOSIS — N85.2 ENLARGED UTERUS: ICD-10-CM

## 2025-06-16 DIAGNOSIS — Z01.419 WELL WOMAN EXAM WITH ROUTINE GYNECOLOGICAL EXAM: Primary | ICD-10-CM

## 2025-06-16 DIAGNOSIS — Z12.4 CERVICAL CANCER SCREENING: ICD-10-CM

## 2025-06-16 DIAGNOSIS — N84.1 CERVICAL POLYP: ICD-10-CM

## 2025-06-16 DIAGNOSIS — R10.2 PELVIC PAIN: ICD-10-CM

## 2025-06-16 PROCEDURE — 3008F BODY MASS INDEX DOCD: CPT

## 2025-06-16 PROCEDURE — 99385 PREV VISIT NEW AGE 18-39: CPT

## 2025-06-16 ASSESSMENT — ENCOUNTER SYMPTOMS
RESPIRATORY NEGATIVE: 0
CARDIOVASCULAR NEGATIVE: 0
EYES NEGATIVE: 0
NEUROLOGICAL NEGATIVE: 0
GASTROINTESTINAL NEGATIVE: 0
CONSTITUTIONAL NEGATIVE: 0
ALLERGIC/IMMUNOLOGIC NEGATIVE: 0
ENDOCRINE NEGATIVE: 0
HEMATOLOGIC/LYMPHATIC NEGATIVE: 0
PSYCHIATRIC NEGATIVE: 0
MUSCULOSKELETAL NEGATIVE: 0

## 2025-06-16 ASSESSMENT — PAIN SCALES - GENERAL: PAINLEVEL_OUTOF10: 0-NO PAIN

## 2025-06-16 NOTE — PROGRESS NOTES
"Rosemary Recinos is a 19 y.o. female who is here for New Patient Visit (Reports abdominal pain periodically for 2 yrs,/No vaginal discharge , odor or discomfort).     Concerns today:  Patient presents reporting 2 years of abdominal pain. Patient reports that the pain is sometimes random but when she is on her cycle it is much worse. Reports that the pain is like a severe cramping sensation.  Patient is not currently having pain. Patient reports that the last time she had the pain was last month. Reports that it is generally on her right side.     Patient had imaging done in  and was + for 4.7 cm probable hemorrhagic cyst at the right ovary.   Periods are regular every 28-30 days, lasting 6 days.   Dysmenorrhea:none.   Cyclic symptoms include none.      PMH: Graves Disease- posy thyroidectomy in     Sexual Activity: sexually active, male partners; Patient reports 1 partners in the last 12 months.    History of prior STI: none  Desires STI screening? No    Current contraception: none    Last pap: NA  Last mammogram: NA    Family history of uterine or ovarian cancer: no  Family history of breast cancer: yes - Great grandmother       OB History    Para Term  AB Living   0 0 0 0 0 0   SAB IAB Ectopic Multiple Live Births   0 0 0 0 0      Objective   /62   Ht 1.626 m (5' 4\")   Wt 67.8 kg (149 lb 6.4 oz)   LMP 2025 (Approximate)      General:   Alert and oriented x 3   Heart:  Lungs: Regular rate, rhythm  Clear to auscultation bilaterally   Thyroid: Euthyroid, normal shape and size   Breast: Symmetrical, no skin changes/nipple discharge, redness, tenderness, no masses palpated bilaterally   Abdomen: Soft, non tender   Vulva: EGBUS normal   Vagina: Pink, normal discharge   Cervix: No CMT, 1cm posterior polyp noted.    Uterus: Abnormal size- uterus measuring size of approximately 18-20 weeks gestation in absence of pregnancy, non-tender on palpation   Adnexa: NT bilaterally "         Assessment/Plan   Diagnoses and all orders for this visit:  Well woman exam with routine gynecological exam  Pelvic pain  -     US PELVIS TRANSABDOMINAL WITH TRANSVAGINAL; Future  Enlarged uterus  Cervical cancer screening  -     THINPREP PAP TEST  Cervical polyp    All patient questions answered.   Encouraged to reach out to our office with any questions or concerns.   Encouraged patient to follow up with transvaginal ultrasound, annually, and prn    VENKATA Mason-MENG

## 2025-06-19 ENCOUNTER — HOSPITAL ENCOUNTER (OUTPATIENT)
Dept: RADIOLOGY | Facility: CLINIC | Age: 20
Discharge: HOME | End: 2025-06-19
Payer: COMMERCIAL

## 2025-06-19 DIAGNOSIS — R10.2 PELVIC PAIN: ICD-10-CM

## 2025-06-19 PROCEDURE — 76856 US EXAM PELVIC COMPLETE: CPT

## 2025-06-23 ENCOUNTER — TELEPHONE (OUTPATIENT)
Dept: OBSTETRICS AND GYNECOLOGY | Facility: CLINIC | Age: 20
End: 2025-06-23
Payer: COMMERCIAL

## 2025-06-23 NOTE — TELEPHONE ENCOUNTER
TELEPHONE CALL TO PATIENT  Identified by name and date of birth.  Reviewed Ultrasound results - large cyst.  And treatment plan - GYN surgery consult.  Patient verbalizes understanding  Scheduled with Dr Cochran 7/3/2025 [id number]

## 2025-06-23 NOTE — TELEPHONE ENCOUNTER
----- Message from Erica Colon sent at 6/23/2025 10:15 AM EDT -----  Regarding: URGENT- Large Ovarian Cyst  This patient has an extremely large ovarian cyst- she will need to follow up with a GYN surgeon. Can you help her coordinate and schedule? I tried calling to discuss earlier today- no answer and left   a message giving her some background and telling her someone would call. Let me know if there are any problems, questions, or concerns.     Thanks!   ----- Message -----  From: Interface, Radiology Results In  Sent: 6/21/2025   5:13 AM EDT  To: VENKATA Mason-MENG

## 2025-07-03 ENCOUNTER — OFFICE VISIT (OUTPATIENT)
Dept: OBSTETRICS AND GYNECOLOGY | Facility: CLINIC | Age: 20
End: 2025-07-03
Payer: COMMERCIAL

## 2025-07-03 VITALS
SYSTOLIC BLOOD PRESSURE: 118 MMHG | DIASTOLIC BLOOD PRESSURE: 73 MMHG | WEIGHT: 147.5 LBS | HEART RATE: 67 BPM | BODY MASS INDEX: 25.32 KG/M2

## 2025-07-03 DIAGNOSIS — R19.00 PELVIC MASS: Primary | ICD-10-CM

## 2025-07-03 PROCEDURE — 99213 OFFICE O/P EST LOW 20 MIN: CPT

## 2025-07-03 PROCEDURE — 99213 OFFICE O/P EST LOW 20 MIN: CPT | Mod: GC

## 2025-07-03 NOTE — ASSESSMENT & PLAN NOTE
Given symptoms and appearance of mass on imaging, discussed concern for endometrioma  Given large mass will refer to MIGS specialist, Dr. Velez for evaluation and possible surgical management of endometriosis   Will order preoperative MRI to evaluate for the presence of deep infiltrating endometriosis  Orders:    MR pelvis w and wo IV contrast; Future

## 2025-07-03 NOTE — PROGRESS NOTES
I saw and evaluated the patient. I personally obtained the key and critical portions of the history and physical exam or was physically present for key and critical portions performed by the resident/fellow. I reviewed the resident/fellow's documentation and discussed the patient with the resident/fellow. I agree with the resident/fellow's medical decision making as documented in the note.    Shraddha Gomez MD

## 2025-07-03 NOTE — PROGRESS NOTES
Vlad Recinos is a 20 y.o. here for pelvic pain and cyst work-up.     HPI: Pain started 2 years ago, is right-sided, fluctuates, and sharp in nature. Pain is worse with periods and severity is a 4 on average. She notes it does not stop her from daily activities. Pain also with bowel movements and urination mainly when she is menstruating. Denies bleeding or discharge, and she is not in pain today. Ultrasound results show a large pelvic cyst.    GynHx: None  Menses: LMP is 7/3, last 5-7 days, menarche at age 10, changes pads/tampons every 4 hours on heaviest days  Periods are regular every 28-30 days  Social History     Substance and Sexual Activity   Sexual Activity Yes    Birth control/protection: None, Condom Male     Sexually active with boyfriend (male)  Current contraception: Condoms  STIs: none  Social History: lives with mother, works at [employer]  Past med hx and past surg hx reviewed and notable for: Graves Disease, thyroidectomy in . Currently taking Levothyroxine. No hx of STIs.  Fam hx: father diabetes, myocardial infarction  Alcohol every few months, denies smoking or drug use hx    Objective   /73   Pulse 67   Wt 66.9 kg (147 lb 8 oz)   LMP 2025 (Approximate)   BMI 25.32 kg/m²     Physical:  General: no acute distress  HEENT: normocephalic, atraumatic  Heart: warm and well perfused  Lungs: breathing comfortably on room air  Abdomen: Mass palpated 1 cm below umbilicus midline but extending into all four quadrants, no tenderness to palpation  Extremities: moving all extremities  Neuro: awake and conversant  Psych: appropriate mood and affect    Imagin/19 Pelvic Transabdominal/Transvaginal Ultrasound     IMPRESSION:  Normal-sized uterus without uterine mass. Normal endometrial thickness is observed.      large bilocular cystic pelvic mass measuring 10.5 x 12.8 x 14.4 cm in size containing diffuse low-level internal echoes with wall irregularity. Which suspect that this may  very well represent very large endometrioma. This was not present on prior pelvic ultrasound. Surgical management should be considered.    Signed by: Adeline Rajan 6/21/2025 5:12 AM    Assessment and Plan:  Vlad Recinos is a 20 y.o. here for pelvic pain and cyst work-up.   Assessment & Plan  Pelvic mass  Given symptoms and appearance of mass on imaging, discussed concern for endometrioma  Given large mass will refer to MIGS specialist, Dr. Velez for evaluation and possible surgical management of endometriosis   Will order preoperative MRI to evaluate for the presence of deep infiltrating endometriosis  Orders:    MR pelvis w and wo IV contrast; Future    Seen and discussed with Dr. Jason Templeton MS3    Patient seen and evaluated with medical student. Agree with assessment above, edits made within text.  Angy Cochran MD PGY-4

## 2025-07-13 LAB
CYTOLOGY CMNT CVX/VAG CYTO-IMP: NORMAL
LAB AP HPV HR: NORMAL
LABORATORY COMMENT REPORT: NORMAL
LMP START DATE: NORMAL
PATH REPORT.TOTAL CANCER: NORMAL

## 2025-07-24 ENCOUNTER — APPOINTMENT (OUTPATIENT)
Dept: RADIOLOGY | Facility: CLINIC | Age: 20
End: 2025-07-24
Payer: COMMERCIAL

## 2025-07-24 DIAGNOSIS — R19.00 PELVIC MASS: ICD-10-CM

## 2025-07-24 PROCEDURE — 72197 MRI PELVIS W/O & W/DYE: CPT

## 2025-07-24 PROCEDURE — A9575 INJ GADOTERATE MEGLUMI 0.1ML: HCPCS | Mod: JW,SE

## 2025-07-24 PROCEDURE — 2550000001 HC RX 255 CONTRASTS: Mod: JW,SE

## 2025-07-24 RX ORDER — GADOTERATE MEGLUMINE 376.9 MG/ML
0.2 INJECTION INTRAVENOUS
Status: COMPLETED | OUTPATIENT
Start: 2025-07-24 | End: 2025-07-24

## 2025-07-24 RX ADMIN — GADOTERATE MEGLUMINE 13 ML: 376.9 INJECTION INTRAVENOUS at 10:38

## 2025-08-04 ENCOUNTER — APPOINTMENT (OUTPATIENT)
Dept: OBSTETRICS AND GYNECOLOGY | Facility: CLINIC | Age: 20
End: 2025-08-04
Payer: COMMERCIAL

## 2025-12-05 ENCOUNTER — APPOINTMENT (OUTPATIENT)
Dept: OPHTHALMOLOGY | Facility: CLINIC | Age: 20
End: 2025-12-05
Payer: COMMERCIAL

## 2026-02-02 ENCOUNTER — APPOINTMENT (OUTPATIENT)
Dept: ENDOCRINOLOGY | Facility: CLINIC | Age: 21
End: 2026-02-02
Payer: COMMERCIAL